# Patient Record
Sex: FEMALE
[De-identification: names, ages, dates, MRNs, and addresses within clinical notes are randomized per-mention and may not be internally consistent; named-entity substitution may affect disease eponyms.]

---

## 2017-01-03 ENCOUNTER — HOSPITAL ENCOUNTER (EMERGENCY)
Dept: HOSPITAL 68 - ERH | Age: 22
End: 2017-01-03
Payer: COMMERCIAL

## 2017-01-03 VITALS — WEIGHT: 160 LBS | BODY MASS INDEX: 29.44 KG/M2 | HEIGHT: 62 IN

## 2017-01-03 VITALS — SYSTOLIC BLOOD PRESSURE: 153 MMHG | DIASTOLIC BLOOD PRESSURE: 80 MMHG

## 2017-01-03 DIAGNOSIS — M62.830: Primary | ICD-10-CM

## 2017-01-03 NOTE — ED NECK/BACK PAIN COMPLAINT
History of Present Illness
 
General
Chief Complaint: General Adult
Stated Complaint: RT SIDED UPPER BACK/RIB PAIN X FEW MONTHS
Source: patient
Exam Limitations: no limitations
 
Vital Signs & Intake/Output
Vital Signs & Intake/Output
 Vital Signs
 
 
Date Time Temp Pulse Resp B/P Pulse O2 O2 Flow FiO2
 
     Ox Delivery Rate 
 
01/03 2223     98 Room Air  
 
01/03 2141 98.1 96 18 153/80 98 Room Air  
 
 
Allergies
Coded Allergies:
No Known Allergies (01/03/17)
 
Reconcile Medications
Calcium Carbonate/Vitamin D3 (Calcium 500 + D Tablet) (Unknown Strength) TABLET 
 (Unknown Dose) PO DAILY SUPPLEMENT  (Reported)
Medroxyprogesterone Acetate (Depo-Provera) 150 MG/ML SYRINGE   1 ML IM Q3M BIRTH
CONTROL  (Reported)
Methocarbamol (Robaxin-750) 750 MG TABLET   1 TAB PO TID PRN SPASM
Naproxen (Naprosyn) 500 MG TABLET   1 TAB PO BID PRN PAIN
 
Triage Note:
PT COMPLAINS OF A FEW MONTHS OF R SIDE MID BACK
 PAIN THAT STARTED TO INCREASE YESTERDAY,DENIES
 URINARY SYMPTOMS AND PAIN INCREASES WITH MOVEMENT
Triage Nurses Notes Reviewed? yes
Pregnant: No
Patient currently breastfeeds: No
HPI:
21-year-old female right-hand dominant  here with complaints of right 
upper and mid back pain and to the rhomboid and latissimus region that wraps 
around to the right lateral flank that has been going on for the past several 
weeks.  It has been intermittent and getting worse recently.  There is been no 
treatment thus far.  Modifying factors include worsening with motion and 
twisting of the thorax and taking a deep breath and lifting.  She denies any 
shortness of breath, cough or hemoptysis.  She denies any unilateral leg 
swelling or edema, she has no chills or sweats or infectious type symptoms.  She
has no history of DVT and no family history of DVT or PE.  She is on the Depo-
Provera injection.  
(LITA HWANG)
 
Past History
 
Travel History
Traveled to [place] past 21 day No
 
Medical History
Any Pertinent Medical History? none
Neurological: NONE
EENT: NONE
Cardiovascular: NONE
Respiratory: NONE
Gastrointestinal: NONE
Hepatic: NONE
Renal: NONE
Musculoskeletal: NONE
Psychiatric: NONE
Endocrine: NONE
Blood Disorders: NONE
Cancer(s): NONE
GYN/Reproductive: NONE
 
Surgical History
Surgical History: none
 
Psychosocial History
What is your primary language English
Tobacco Use: Never used
ETOH Use: denies use
Illicit Drug Use: denies illicit drug use
 
Family History
Hx Contributory? No
(LITA HWANG)
 
Review of Systems
 
Review of Systems
Constitutional:
Reports: see HPI. 
Eyes:
Reports: no symptoms. 
Ears, Nose, Throat, Mouth:
Reports: no symptoms. 
Respiratory:
Reports: no symptoms. 
Cardiovascular:
Reports: no symptoms. 
Gastrointestinal/Abdominal:
Reports: no symptoms. 
Skin:
Reports: no symptoms. 
Neurological/Psychological:
Reports: no symptoms. 
All Other Systems: Reviewed and Negative
(LITA HWANG)
 
Physical Exam
 
Physical Exam
General Appearance: well developed/nourished, mild distress
Head: atraumatic
Eyes:
Bilateral: PERRL, EOMI. 
Ears, Nose, Throat, Mouth: hearing grossly normal
Neck: normal inspection, supple, full range of motion, normal alignment
Respiratory: normal breath sounds
Cardiovascular: regular rate/rhythm
Gastrointestinal: soft, non-tender
Back: TENDERNESS TO THE RIGHT UPPER BACK ALONG THE PARAVERTEBRAL MUSCULATURE AND
MEDIAL SCAPULAR BORDER THAT EXTENDS AROUND THE SCAPULAR BORDER INFERIORLY AND 
INTO THE RIGHT FLANK.  tHERE IS MILD MUSCLE SPASM NOTED SINCE AREA.  tHERE IS 
INCREASED PAIN WITH ROTATION OF THE THORAX AND FLEXION FORWARD.
Extremities: normal range of motion
Neurologic/Psych: awake, alert, oriented x 3, normal mood/affect
Skin: intact, normal color, warm/dry
(LITA HWANG)
 
Progress
Differential Diagnosis: AAA, aortic dissection, C spine injury, carotid 
dissection, cauda equina syn, herniated disc, myofascial strain, pyelo/UTI, 
sciatica, spinal cord inj, thoracic outlet syn, T/L spine injury, 
ureterolithiasis
Plan of Care:
Treatment with anti-inflammatory medication, muscle relaxers, rest, job 
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 
stretching.  Recommend orthopedic follow-up in one week if no better.  
Considered pulmonary embolism, patient not tachycardic, has a negative Wells 
criteria, only risk factor is a Depo-Provera injection, symptoms are 
reproducible. 
(LITA HWANG)
 
Departure
 
Departure
Disposition: HOME OR SELF CARE
Condition: Stable
Clinical Impression
Primary Impression: Spasm of muscle, back
Referrals:
PABLITO LORENZO,KAMRON PAREDES
 
Additional Instructions:
Take medications for pain, spasm and inflammation as needed.
Rest, warm compresses, gentle stretching.
Follow-up with orthopedist if no better in the next 5-7 days.
Watch for worsening symptoms of pain, numbness or weakness down the leg, return 
with any concerns.
Departure Forms:
Customer Survey
General Discharge Information
Prescriptions:
Current Visit Scripts
Methocarbamol (Robaxin-750) 1 TAB PO TID PRN SPASM
     #21 TAB 
 
Naproxen (Naprosyn) 1 TAB PO BID PRN PAIN
     #30 TAB 
 
 
(LITA HWANG)
 
PA/NP Co-Sign Statement
Statement:
ED Attending supervision documentation-
 
[] I saw and evaluated the patient. I have also reviewed all the pertinent lab 
results and diagnostic results. I agree with the findings and the plan of care 
as documented in the PA's/NP's documentation. 
 
[X] I have reviewed the ED Record and agree with the PA's/NP's documentation.
 
[] Additions or exceptions (if any) to the PAs/NP's note and plan are 
summarized below:
[]
 
(ANILA LORENZO,RONA WALTER)

## 2018-07-19 ENCOUNTER — HOSPITAL ENCOUNTER (INPATIENT)
Dept: HOSPITAL 68 - SDA | Age: 23
LOS: 3 days | DRG: 540 | End: 2018-07-22
Attending: SPECIALIST | Admitting: SPECIALIST
Payer: COMMERCIAL

## 2018-07-19 VITALS — BODY MASS INDEX: 33.13 KG/M2 | WEIGHT: 187 LBS | HEIGHT: 63 IN

## 2018-07-19 DIAGNOSIS — O36.63X0: Primary | ICD-10-CM

## 2018-07-19 DIAGNOSIS — Z3A.39: ICD-10-CM

## 2018-07-19 NOTE — OPERATIVE REPORT
Operative/Inv Procedure Report
Surgery Date: 18
Name of Procedure:
Primary low flap transverse  section Via Pfannenstiel skin incision
Pre-Operative Diagnosis:
Suspected macrosomia IUP at 39 weeks patient choice
Post-Operative Diagnosis:
Same
Estimated Blood Loss: scant (500)
Surgeon/Assistant:
Giuliana Gonsalez MD  And Dr. Ford Hooper
 
Anesthesia: block
 
Operative/Procedure Note
Note:
Procedure note patient was taken the operating room placed on position after 
adequate anesthesia patient placed dorsolithotomy position the vagina was 
prepped and draped so fashion Bridges was placed sterilely by the nurses patient 
was returned supine position timeout was performed skin testing was found to be 
adequate for surgery the abdomen was prepped and draped sterile fashion through 
a Pfannenstiel skin incision 2 finger breadths of symphysis pubis and midline 
skin was cut was carried down to rectus fascia which was cut in curvilinear 
fashion either direction peritoneal cavity was entered high into the abdomen the
lower blade of the mouth was placed in lowering the incision the visceral 
peritoneum of the uterus was dissected anteriorly the bladder flap was developed
and the bladder blade was replaced at this point in the lower uterine segment 
use was nicked entered with the back and knife dissected bluntly as well as 
sharply the infant was delivered over the abdominal wall after an extension was 
made in the fascia for delivery the cord was doubly clamped and cut and the 
infant was handed to pediatricians waiting delivery room to aid in resuscitation
placenta was delivered manually noted to be intact was likely due to a dry laps 
to ensure it was free of adherent membranes it was apparent intravenous Pitocin 
intramyometrial Pitocin was used to aid in uterine contractility patient 
tolerated that well at this point uterus returned to abdominal cavity was 
irrigated copious amounts of saline to clear once again her knee was 
reapproximated using 0 the fascia was reapproximated to continue sutures of #1 
skin was reapproximated staples at the end the case counts correct urine was 
clear mother and infant transferred recovery room awake and alert counts correct

## 2018-07-19 NOTE — HISTORY & PHYSICAL PRE-OP
General Information and HPI
MD Statement:
I have seen and personally examined FAVIOLAPILY and documented this H&P.
 
The patient is a 22 year old F who presented with a patient stated chief 
complaint of large for gestational age infant [].
 
History of Present Illness:
22-year-old  1 para 0 at 39 weeks gestation with an estimated fetal 
weight of greater than 4000 g 500 sequentially the patient had ultrasounds in 
my office that demonstrated large for gestational age infant patient is opted 
for  section given the risks of shoulder dystocia patient has had 
adequate prenatal care which started with Dr. Edgar Murray she had a 
abnormal 1 hour glucose test and normal 3 hour glucose test
 
Allergies/Medications
Allergies:
Coded Allergies:
No Known Allergies (17)
 
Home Med list
Calcium Carbonate/Vitamin D3 (Calcium 500 + D Tablet) (Unknown Strength) TABLET 
 (Unknown Dose) PO DAILY SUPPLEMENT  (Reported)
Medroxyprogesterone Acetate (Depo-Provera) 150 MG/ML SYRINGE   1 ML IM Q3M BIRTH
CONTROL  (Reported)
Methocarbamol (Robaxin-750) 750 MG TABLET   1 TAB PO TID PRN SPASM
Naproxen (Naprosyn) 500 MG TABLET   1 TAB PO BID PRN PAIN
Ondansetron (Zofran Odt) 4 MG TAB.RAPDIS   1 TAB SL TID PRN NAUSEA 
Ondansetron (Zofran Odt) 4 MG TAB.RAPDIS   1 TAB SL TID PRN NAUSEA
 
 
Past History
 
Medical History
Neurological: NONE
EENT: NONE
Cardiovascular: NONE
Respiratory: NONE
Gastrointestinal: NONE
Hepatic: NONE
Renal: NONE
Musculoskeletal: NONE
Psychiatric: NONE
Endocrine: NONE
Blood Disorders: NONE
Cancer(s): NONE
GYN/Reproductive: NONE
Isolation History: Standard
 
Surgical History
Pertinent Surgical History: none
 
Past Family/Social History
 
Psychosocial History
Smoking Status: Never Smoked
 
Review of Systems
Review of Systems:
13 point review of systems as stated in the HPI
 
Exam & Diagnostic Data
Last 24 Hrs of Vital Signs/I&O
 Intake & Output
 
 
  1600  0800  0000
 
Intake Total   
 
Output Total   
 
Balance   
 
    
 
Patient 187 lb  
 
Weight   
 
 
 
Physical Exam:
And pleasant  female with glasses
HEENT anicteric
Lungs clear
Heart S1-S2
Abdomen gravid estimated fetal weight 4100 g pelvic is long and closed 
extremities
Negative edema negative Homans
 
Assessment/Plan
Assessment/Plan:
Assessment is IUP at 39 weeks macrosomia plan is for  section
 
As Ranked By This Provider
Problem List:
 1. Pregnancy
 
 
Attending MD Review Statement
 
Attending Statement
Attending MD Statement: examined this patient

## 2018-07-20 LAB
ABSOLUTE GRANULOCYTE CT: 9.2 /CUMM (ref 1.4–6.5)
BASOPHILS # BLD: 0 /CUMM (ref 0–0.2)
BASOPHILS NFR BLD: 0.3 % (ref 0–2)
EOSINOPHIL # BLD: 0.1 /CUMM (ref 0–0.7)
EOSINOPHIL NFR BLD: 0.7 % (ref 0–5)
ERYTHROCYTE [DISTWIDTH] IN BLOOD BY AUTOMATED COUNT: 15.2 % (ref 11.5–14.5)
GRANULOCYTES NFR BLD: 71.5 % (ref 42.2–75.2)
HCT VFR BLD CALC: 28.2 % (ref 37–47)
LYMPHOCYTES # BLD: 2.6 /CUMM (ref 1.2–3.4)
MCH RBC QN AUTO: 29.2 PG (ref 27–31)
MCHC RBC AUTO-ENTMCNC: 33.2 G/DL (ref 33–37)
MCV RBC AUTO: 88.1 FL (ref 81–99)
MONOCYTES # BLD: 1 /CUMM (ref 0.1–0.6)
PLATELET # BLD: 152 /CUMM (ref 130–400)
PMV BLD AUTO: 11.1 FL (ref 7.4–10.4)
RED BLOOD CELL CT: 3.2 /CUMM (ref 4.2–5.4)
WBC # BLD AUTO: 12.8 /CUMM (ref 4.8–10.8)

## 2018-07-20 NOTE — PN- POST DELIVERY/GYN
Subjective
Subjective:
NO COMPLAINTS
 
Objective
Last 24 Hrs of Vital Signs/I&O
AS PER CHART
Physical Exam:
PE VERY PLEASANT HF
ABD SOFT NT 
INCISION CDI 
EXT - EDEMA -HOMANS
LOCHIA MINIMAL
 
Assessment/Plan
Assessment/Plan
ASSESS S/P C/S 
PLAN CONT PPC

## 2018-07-21 NOTE — PN- POST DELIVERY/GYN
Subjective
Subjective:
NO C/O; BOTTLE FEEDING ONLY
Review of Systems:
POS FLATUS
 
Objective
Last 24 Hrs of Vital Signs/I&O
VSS
Physical Exam:
FF
INCISION C/D/I
EXT NT
 
Assessment/Plan
Assessment/Plan
S/P C/S POD2 STABLE
DISCHARGE TOMORROW
Problem List:
 1. Pregnancy